# Patient Record
Sex: MALE | Race: WHITE | NOT HISPANIC OR LATINO | Employment: FULL TIME | URBAN - METROPOLITAN AREA
[De-identification: names, ages, dates, MRNs, and addresses within clinical notes are randomized per-mention and may not be internally consistent; named-entity substitution may affect disease eponyms.]

---

## 2024-10-19 ENCOUNTER — OFFICE VISIT (OUTPATIENT)
Dept: URGENT CARE | Age: 69
End: 2024-10-19
Payer: MEDICARE

## 2024-10-19 DIAGNOSIS — S99.921A INJURY OF TOE ON RIGHT FOOT, INITIAL ENCOUNTER: Primary | ICD-10-CM

## 2024-10-19 ASSESSMENT — ENCOUNTER SYMPTOMS
ALLERGIC/IMMUNOLOGIC NEGATIVE: 1
ENDOCRINE NEGATIVE: 1
GASTROINTESTINAL NEGATIVE: 1
RESPIRATORY NEGATIVE: 1
HEMATOLOGIC/LYMPHATIC NEGATIVE: 1
MUSCULOSKELETAL NEGATIVE: 1
PSYCHIATRIC NEGATIVE: 1
NEUROLOGICAL NEGATIVE: 1
EYES NEGATIVE: 1
CONSTITUTIONAL NEGATIVE: 1
PALPITATIONS: 0

## 2024-10-19 NOTE — PROGRESS NOTES
Subjective   Patient ID: Facundo Llanos is a 69 y.o. male. They present today with a chief complaint of No chief complaint on file..    History of Present Illness  HPI  Pt presents with R great toe injury.  Pt states he dropped a heavy piece of concrete on his toe just prior to arrival.  Denies any other injuries.  Reports he is diabetic.      Past Medical History  Allergies as of 10/19/2024    (Not on File)       (Not in a hospital admission)       Past Medical History:   Diagnosis Date    Abnormal findings on diagnostic imaging of heart and coronary circulation     Elevated coronary artery calcium score    Age-related nuclear cataract, bilateral 09/12/2017    Nuclear sclerotic cataract of both eyes    Encounter for other preprocedural examination     Preop examination    Encounter for screening for malignant neoplasm of colon 05/18/2017    Encounter for colonoscopy due to history of colonic polyp    Encounter for screening for malignant neoplasm of prostate     Screening PSA (prostate specific antigen)    Pain in unspecified shoulder     Shoulder pain    Personal history of other diseases of the circulatory system     History of hypertension    Personal history of other diseases of the musculoskeletal system and connective tissue     History of chronic back pain    Personal history of other endocrine, nutritional and metabolic disease     History of hyperlipidemia    Proteinuria, unspecified     Proteinuria    Radiculopathy, lumbar region     Lumbar radiculopathy, acute    Type 2 diabetes mellitus without complications (Multi) 09/12/2017    Diabetes type 2, no ocular involvement    Vitreous degeneration, left eye 09/12/2017    PVD (posterior vitreous detachment), left eye       Past Surgical History:   Procedure Laterality Date    HERNIA REPAIR  08/15/2016    Hernia Repair    OTHER SURGICAL HISTORY  08/15/2016    Shoulder Surgery Left    OTHER SURGICAL HISTORY  03/25/2019    Colonoscopy    SHOULDER SURGERY   08/15/2016    Shoulder Surgery Right            Review of Systems  Review of Systems   Constitutional: Negative.    HENT: Negative.     Eyes: Negative.    Respiratory: Negative.     Cardiovascular:  Negative for chest pain and palpitations.   Gastrointestinal: Negative.    Endocrine: Negative.    Genitourinary: Negative.    Musculoskeletal: Negative.    Skin: Negative.    Allergic/Immunologic: Negative.    Neurological: Negative.    Hematological: Negative.    Psychiatric/Behavioral: Negative.     All other systems reviewed and are negative.                                 Objective    There were no vitals filed for this visit.  No LMP for male patient.    Physical Exam  Vitals and nursing note reviewed.   Constitutional:       General: He is not in acute distress.     Appearance: Normal appearance. He is not ill-appearing or toxic-appearing.   HENT:      Head: Atraumatic.      Right Ear: Tympanic membrane, ear canal and external ear normal.      Left Ear: Tympanic membrane, ear canal and external ear normal.      Nose: Nose normal.      Mouth/Throat:      Mouth: Mucous membranes are moist.      Pharynx: Oropharynx is clear. No oropharyngeal exudate or posterior oropharyngeal erythema.   Eyes:      Extraocular Movements: Extraocular movements intact.      Conjunctiva/sclera: Conjunctivae normal.      Pupils: Pupils are equal, round, and reactive to light.   Cardiovascular:      Rate and Rhythm: Normal rate and regular rhythm.      Pulses: Normal pulses.      Heart sounds: Normal heart sounds.   Pulmonary:      Effort: Pulmonary effort is normal.      Breath sounds: Normal breath sounds.   Abdominal:      General: Abdomen is flat. Bowel sounds are normal.      Palpations: Abdomen is soft.      Tenderness: There is no abdominal tenderness.   Musculoskeletal:         General: Normal range of motion.      Cervical back: Normal range of motion and neck supple. No tenderness.   Skin:     General: Skin is warm and dry.       Capillary Refill: Capillary refill takes less than 2 seconds.   Neurological:      General: No focal deficit present.      Mental Status: He is alert and oriented to person, place, and time.   Psychiatric:         Mood and Affect: Mood normal.         Behavior: Behavior normal.         Thought Content: Thought content normal.         Nerve Block    Date/Time: 10/19/2024 4:09 PM    Performed by: JAKOB Rosen  Authorized by: JAKOB Rosen    Consent:     Consent obtained:  Verbal    Consent given by:  Patient    Risks, benefits, and alternatives were discussed: yes      Risks discussed:  Allergic reaction, infection, nerve damage, swelling, bleeding, intravenous injection, pain and unsuccessful block    Alternatives discussed:  No treatment, delayed treatment, alternative treatment and referral  Universal protocol:     Procedure explained and questions answered to patient or proxy's satisfaction: yes      Patient identity confirmed:  Verbally with patient  Indications:     Indications:  Pain relief  Location:     Body area:  Lower extremity    Lower extremity nerve blocked: great toe.    Laterality:  Right  Pre-procedure details:     Skin preparation:  Alcohol    Preparation: Patient was prepped and draped in usual sterile fashion    Procedure details:     Block needle gauge:  22 G    Anesthetic injected:  Lidocaine 2% w/o epi    Injection procedure:  Anatomic landmarks identified, negative aspiration for blood and anatomic landmarks palpated  Post-procedure details:     Outcome:  Pain improved    Procedure completion:  Tolerated well, no immediate complications  Nail Care    Date/Time: 10/19/2024 4:10 PM    Performed by: JAKOB Rosen  Authorized by: JAKOB Rosen    Consent:     Consent obtained:  Verbal    Consent given by:  Patient    Risks, benefits, and alternatives were discussed: yes      Risks discussed:  Bleeding, incomplete removal, infection, pain and  permanent nail deformity    Alternatives discussed:  No treatment, delayed treatment, alternative treatment, observation and referral  Universal protocol:     Procedure explained and questions answered to patient or proxy's satisfaction: yes      Patient identity confirmed:  Verbally with patient  Procedure details:     Location:  Foot    Foot location:  R big toe  Anesthesia:     Anesthesia method:  Nerve block  Trephination:     Subungual hematoma drained: yes      Trephination instrument:  Cautery  Post-procedure details:     Dressing:  4x4 sterile gauze    Procedure completion:  Tolerated well, no immediate complications      Point of Care Test & Imaging Results from this visit  No results found for this visit on 10/19/24.   No results found.    Diagnostic study results (if any) were reviewed by JAKOB Rosen.    Assessment/Plan   Allergies, medications, history, and pertinent labs/EKGs/Imaging reviewed by JAKOB Rosen.     Medical Decision Making  Pt presents with R great toe injury.  Pt with swelling, bruising, open wound of great toe.  Suspect likely open fracture.  Given that pt is diabetic and in severe pain, recommend further evaluation in ED.  Pt is requesting digital block to get him comfortable prior to going to ED.  Digital block performed.  Also attempted to trephinate nail to relieve pressure.  Pt was offered xrays here in [facility] but states if he is going to ED, he prefers to have them done there.  Significant other is with pt and will drive him to [facility] [place] ED.     Orders and Diagnoses  There are no diagnoses linked to this encounter.    Medical Admin Record      Patient disposition: ED    Electronically signed by JAKOB Roesn  4:11 PM

## 2025-08-06 ENCOUNTER — PATIENT OUTREACH (OUTPATIENT)
Dept: PRIMARY CARE | Facility: CLINIC | Age: 70
End: 2025-08-06

## 2025-08-06 DIAGNOSIS — Z12.11 SCREENING FOR COLORECTAL CANCER: ICD-10-CM

## 2025-08-06 DIAGNOSIS — Z12.12 SCREENING FOR COLORECTAL CANCER: ICD-10-CM
